# Patient Record
Sex: FEMALE | Race: WHITE | NOT HISPANIC OR LATINO | URBAN - METROPOLITAN AREA
[De-identification: names, ages, dates, MRNs, and addresses within clinical notes are randomized per-mention and may not be internally consistent; named-entity substitution may affect disease eponyms.]

---

## 2017-12-12 ENCOUNTER — EMERGENCY (EMERGENCY)
Facility: HOSPITAL | Age: 52
LOS: 1 days | Discharge: HOME | End: 2017-12-12
Attending: EMERGENCY MEDICINE | Admitting: EMERGENCY MEDICINE
Payer: COMMERCIAL

## 2017-12-12 VITALS
TEMPERATURE: 98 F | RESPIRATION RATE: 18 BRPM | HEIGHT: 62 IN | DIASTOLIC BLOOD PRESSURE: 83 MMHG | WEIGHT: 180.78 LBS | OXYGEN SATURATION: 97 % | HEART RATE: 67 BPM | SYSTOLIC BLOOD PRESSURE: 140 MMHG

## 2017-12-12 VITALS
SYSTOLIC BLOOD PRESSURE: 108 MMHG | OXYGEN SATURATION: 96 % | HEART RATE: 91 BPM | RESPIRATION RATE: 18 BRPM | TEMPERATURE: 97 F | DIASTOLIC BLOOD PRESSURE: 74 MMHG

## 2017-12-12 LAB
ALBUMIN SERPL ELPH-MCNC: 3.7 G/DL — SIGNIFICANT CHANGE UP (ref 3.3–5)
ALP SERPL-CCNC: 60 U/L — SIGNIFICANT CHANGE UP (ref 40–120)
ALT FLD-CCNC: 17 U/L — SIGNIFICANT CHANGE UP (ref 12–78)
ANION GAP SERPL CALC-SCNC: 5 MMOL/L — SIGNIFICANT CHANGE UP (ref 5–17)
APTT BLD: 30.9 SEC — SIGNIFICANT CHANGE UP (ref 27.5–37.4)
AST SERPL-CCNC: 16 U/L — SIGNIFICANT CHANGE UP (ref 15–37)
BASOPHILS # BLD AUTO: 0.1 K/UL — SIGNIFICANT CHANGE UP (ref 0–0.2)
BASOPHILS NFR BLD AUTO: 1.2 % — SIGNIFICANT CHANGE UP (ref 0–2)
BILIRUB SERPL-MCNC: 1.1 MG/DL — SIGNIFICANT CHANGE UP (ref 0.2–1.2)
BUN SERPL-MCNC: 15 MG/DL — SIGNIFICANT CHANGE UP (ref 7–23)
CALCIUM SERPL-MCNC: 8.9 MG/DL — SIGNIFICANT CHANGE UP (ref 8.5–10.1)
CHLORIDE SERPL-SCNC: 109 MMOL/L — HIGH (ref 96–108)
CK MB BLD-MCNC: 1.3 % — SIGNIFICANT CHANGE UP (ref 0–3.5)
CK MB BLD-MCNC: 1.6 % — SIGNIFICANT CHANGE UP (ref 0–3.5)
CK MB CFR SERPL CALC: 1.2 NG/ML — SIGNIFICANT CHANGE UP (ref 0–3.6)
CK MB CFR SERPL CALC: 1.5 NG/ML — SIGNIFICANT CHANGE UP (ref 0–3.6)
CK SERPL-CCNC: 90 U/L — SIGNIFICANT CHANGE UP (ref 26–192)
CK SERPL-CCNC: 92 U/L — SIGNIFICANT CHANGE UP (ref 26–192)
CO2 SERPL-SCNC: 29 MMOL/L — SIGNIFICANT CHANGE UP (ref 22–31)
CREAT SERPL-MCNC: 0.78 MG/DL — SIGNIFICANT CHANGE UP (ref 0.5–1.3)
EOSINOPHIL # BLD AUTO: 0.5 K/UL — SIGNIFICANT CHANGE UP (ref 0–0.5)
EOSINOPHIL NFR BLD AUTO: 6.6 % — HIGH (ref 0–6)
GLUCOSE SERPL-MCNC: 95 MG/DL — SIGNIFICANT CHANGE UP (ref 70–99)
HCT VFR BLD CALC: 42.1 % — SIGNIFICANT CHANGE UP (ref 34.5–45)
HGB BLD-MCNC: 14 G/DL — SIGNIFICANT CHANGE UP (ref 11.5–15.5)
INR BLD: 1.07 RATIO — SIGNIFICANT CHANGE UP (ref 0.88–1.16)
LYMPHOCYTES # BLD AUTO: 2.6 K/UL — SIGNIFICANT CHANGE UP (ref 1–3.3)
LYMPHOCYTES # BLD AUTO: 34.8 % — SIGNIFICANT CHANGE UP (ref 13–44)
MCHC RBC-ENTMCNC: 30.6 PG — SIGNIFICANT CHANGE UP (ref 27–34)
MCHC RBC-ENTMCNC: 33.4 GM/DL — SIGNIFICANT CHANGE UP (ref 32–36)
MCV RBC AUTO: 91.8 FL — SIGNIFICANT CHANGE UP (ref 80–100)
MONOCYTES # BLD AUTO: 0.5 K/UL — SIGNIFICANT CHANGE UP (ref 0–0.9)
MONOCYTES NFR BLD AUTO: 6.6 % — SIGNIFICANT CHANGE UP (ref 1–9)
NEUTROPHILS # BLD AUTO: 3.7 K/UL — SIGNIFICANT CHANGE UP (ref 1.8–7.4)
NEUTROPHILS NFR BLD AUTO: 50.8 % — SIGNIFICANT CHANGE UP (ref 43–77)
PLATELET # BLD AUTO: 139 K/UL — LOW (ref 150–400)
POTASSIUM SERPL-MCNC: 4.7 MMOL/L — SIGNIFICANT CHANGE UP (ref 3.5–5.3)
POTASSIUM SERPL-SCNC: 4.7 MMOL/L — SIGNIFICANT CHANGE UP (ref 3.5–5.3)
PROT SERPL-MCNC: 6.7 G/DL — SIGNIFICANT CHANGE UP (ref 6–8.3)
PROTHROM AB SERPL-ACNC: 11.7 SEC — SIGNIFICANT CHANGE UP (ref 9.8–12.7)
RBC # BLD: 4.58 M/UL — SIGNIFICANT CHANGE UP (ref 3.8–5.2)
RBC # FLD: 12.4 % — SIGNIFICANT CHANGE UP (ref 10.3–14.5)
SODIUM SERPL-SCNC: 143 MMOL/L — SIGNIFICANT CHANGE UP (ref 135–145)
TROPONIN I SERPL-MCNC: <.015 NG/ML — SIGNIFICANT CHANGE UP (ref 0.01–0.04)
TROPONIN I SERPL-MCNC: <.015 NG/ML — SIGNIFICANT CHANGE UP (ref 0.01–0.04)
WBC # BLD: 7.4 K/UL — SIGNIFICANT CHANGE UP (ref 3.8–10.5)
WBC # FLD AUTO: 7.4 K/UL — SIGNIFICANT CHANGE UP (ref 3.8–10.5)

## 2017-12-12 PROCEDURE — 99285 EMERGENCY DEPT VISIT HI MDM: CPT

## 2017-12-12 PROCEDURE — 71045 X-RAY EXAM CHEST 1 VIEW: CPT

## 2017-12-12 PROCEDURE — 80053 COMPREHEN METABOLIC PANEL: CPT

## 2017-12-12 PROCEDURE — 71010: CPT | Mod: 26

## 2017-12-12 PROCEDURE — 93010 ELECTROCARDIOGRAM REPORT: CPT

## 2017-12-12 PROCEDURE — 82553 CREATINE MB FRACTION: CPT

## 2017-12-12 PROCEDURE — 82550 ASSAY OF CK (CPK): CPT

## 2017-12-12 PROCEDURE — 36000 PLACE NEEDLE IN VEIN: CPT

## 2017-12-12 PROCEDURE — 99283 EMERGENCY DEPT VISIT LOW MDM: CPT | Mod: 25

## 2017-12-12 PROCEDURE — 85730 THROMBOPLASTIN TIME PARTIAL: CPT

## 2017-12-12 PROCEDURE — 85610 PROTHROMBIN TIME: CPT

## 2017-12-12 PROCEDURE — 85027 COMPLETE CBC AUTOMATED: CPT

## 2017-12-12 PROCEDURE — 93005 ELECTROCARDIOGRAM TRACING: CPT

## 2017-12-12 PROCEDURE — 36415 COLL VENOUS BLD VENIPUNCTURE: CPT

## 2017-12-12 PROCEDURE — 84484 ASSAY OF TROPONIN QUANT: CPT

## 2017-12-12 RX ORDER — ASPIRIN/CALCIUM CARB/MAGNESIUM 324 MG
325 TABLET ORAL ONCE
Qty: 0 | Refills: 0 | Status: COMPLETED | OUTPATIENT
Start: 2017-12-12 | End: 2017-12-12

## 2017-12-12 RX ORDER — SODIUM CHLORIDE 9 MG/ML
3 INJECTION INTRAMUSCULAR; INTRAVENOUS; SUBCUTANEOUS ONCE
Qty: 0 | Refills: 0 | Status: COMPLETED | OUTPATIENT
Start: 2017-12-12 | End: 2017-12-12

## 2017-12-12 RX ORDER — NATALIZUMAB 300 MG/15ML
0 INJECTION INTRAVENOUS
Qty: 0 | Refills: 0 | COMMUNITY

## 2017-12-12 RX ADMIN — SODIUM CHLORIDE 3 MILLILITER(S): 9 INJECTION INTRAMUSCULAR; INTRAVENOUS; SUBCUTANEOUS at 16:31

## 2017-12-12 RX ADMIN — Medication 325 MILLIGRAM(S): at 16:34

## 2017-12-12 NOTE — CONSULT NOTE ADULT - SUBJECTIVE AND OBJECTIVE BOX
[facility]         Vitaly Todd, Anoop, Jennifer, Mahnaz, Cooper, Mikel        [phone] (office)    CHIEF COMPLAINT: Patient is a 52y old  Female who presents with a chief complaint of cp    HPI: 52f smoker, MS x 13y without any limitations.  Active at baseline and goes to the gym 3-4 x per week, where she does aerobic and resistance training. No baseline sxs of angina, hf, or arrhythmia.  Today at ~1030 developed 5/10 chest discomfort felt to be "agita" with belching.  This has been constant since then.  Over time she began to develop sxs in her left jaw and arm.  No other rad or assoc sxs.  Came to er for evaluation. EKG and CE x 1 normal.      PAST MEDICAL & SURGICAL HISTORY:  Multiple sclerosis      SOCIAL HISTORY: No active   alcohol or illicit drug use. 1/2 ppd smoker    FAMILY HISTORY: htn, primary pulm htn      Outpatient medications: tycabri    MEDICATIONS  (STANDING):    MEDICATIONS  (PRN):      Allergies    penicillin (Unknown)    Intolerances        REVIEW OF SYSTEMS: Is negative for eye, ENT, GI, , allergic, dermatologic, musculoskeletal and neurologic, except as described above.    VITAL SIGNS:   Vital Signs Last 24 Hrs  T(C): 36.5 (12 Dec 2017 15:39), Max: 36.5 (12 Dec 2017 15:39)  T(F): 97.7 (12 Dec 2017 15:39), Max: 97.7 (12 Dec 2017 15:39)  HR: 67 (12 Dec 2017 15:39) (67 - 67)  BP: 140/83 (12 Dec 2017 15:39) (140/83 - 140/83)  BP(mean): --  RR: 18 (12 Dec 2017 15:39) (18 - 18)  SpO2: 97% (12 Dec 2017 15:39) (97% - 97%)    I&O's Summary      PHYSICAL EXAM:    Constitutional: NAD, awake and alert, well-developed  Eyes:  EOMI, no oral cyanosis, conjunctivae clear, anicteric.  Pulmonary: Non-labored, breath sounds are clear bilaterally, no wheezing, rales or rhonchi  Cardiovascular:  regular S1 and S2. 1-2/6 sys murmur.  No rubs, gallops or clicks  Gastrointestinal: Bowel Sounds present, soft, nontender.   Lymph: No peripheral edema.   Neurological: Alert, strength and sensitivity are grossly intact  Skin: No obvious lesions/rashes.   Psych:  Mood & affect appropriate .    LABS: All Labs Reviewed:                        14.0   7.4   )-----------( 139      ( 12 Dec 2017 16:21 )             42.1     12 Dec 2017 16:21    143    |  109    |  15     ----------------------------<  95     4.7     |  29     |  0.78     Ca    8.9        12 Dec 2017 16:21    TPro  6.7    /  Alb  3.7    /  TBili  1.1    /  DBili  x      /  AST  16     /  ALT  17     /  AlkPhos  60     12 Dec 2017 16:21    PT/INR - ( 12 Dec 2017 16:21 )   PT: 11.7 sec;   INR: 1.07 ratio         PTT - ( 12 Dec 2017 16:21 )  PTT:30.9 sec  CARDIAC MARKERS ( 12 Dec 2017 16:21 )  <.015 ng/mL / x     / 92 U/L / x     / 1.5 ng/mL      Blood Culture:         RADIOLOGY:    EKG: sr normal

## 2017-12-12 NOTE — CONSULT NOTE ADULT - ASSESSMENT
52f smoker, MS x 13y without any limitations.  Active at baseline and goes to the gym 3-4 x per week, where she does aerobic and resistance training. No baseline sxs of angina, hf, or arrhythmia.  Today at ~1030 developed 5/10 chest discomfort felt to be "agita" with belching.  This has been constant since then.  Over time she began to develop sxs in her left jaw and arm.  No other rad or assoc sxs.  Came to er for evaluation. EKG and CE x 1 normal.      The patient's chest discomfort is of uncertain etiology. Overall, at this point, it seems relatively unlikely that the patient is suffering chest discomfort on the basis of an acute coronary syndrome.    Based on the patient's history, physical examination, EKG and laboratory testing, it seems as if the patient is at not a high risk individual. Based on the patient's clinical course, I would recommend repetition of the patient's cardiac enzymes. If the patient's second set of enzymes is negative, I would recommend that they be discharged with the expectation that they would followup in our office for outpatient testing.  I have recommended outpatient echocardiography to make an assessment of cardiac structural integrity, as well as nuclear stress testing. The patient has been provided written instructions to obtain these in our office. The patient should continue aspirin, 81 mg daily, at least until the above-mentioned examinations have been completed.    If those examinations are unremarkable, alternative explanations for chest discomfort, including a gastrointestinal etiology and a musculoskeletal etiology, will need to be considered.    She has been advised to stop smoking.    Her cardiac murmur is suggestive of av sclerosis.

## 2017-12-12 NOTE — ED ADULT NURSE NOTE - CHPI ED SYMPTOMS NEG
no nausea/no diaphoresis/no dizziness/no vomiting/no shortness of breath/no fever/no back pain/no chills/no cough/no syncope

## 2017-12-12 NOTE — ED ADULT NURSE NOTE - OBJECTIVE STATEMENT
patient presenting to ED with chest pain radiating to jaw and left arm, denies any other discomfort at this time. Cardiac monitoring ongoing.

## 2017-12-12 NOTE — ED PROVIDER NOTE - PROGRESS NOTE DETAILS
dean (cards) seen eval pt, requests 2nd ce at 2000 and d/c if neg for outpt f/u Reevaluated patient at bedside.  Patient feeling well.  Discussed the results of all diagnostic testing in ED and copies of all reports given.   An opportunity to ask questions was given.  Discussed the importance of prompt, close medical follow-up.  Patient will return with any changes, concerns or persistent / worsening symptoms.  Understanding of all instructions verbalized.

## 2017-12-12 NOTE — ED PROVIDER NOTE - OBJECTIVE STATEMENT
pt with constant left chest pressure radiating to jaw and left arm today. no fevers, chills, ha, sob, cough, abd pain, edema, calf pain, travel.  pmd - buffa  cards - none

## 2020-04-27 NOTE — ED ADULT TRIAGE NOTE - DIRECT TO ROOM CARE INITIATED:
lipitor  Last visit: 1/10/20  Follow up visit: none  Last refill: 1/13/20  Labs:   CHOLESTEROL (mg/dL)   Date Value   01/10/2020 163     Cholesterol (mg/dL)   Date Value   03/13/2020 110     HDL (mg/dL)   Date Value   03/13/2020 32 (L)   01/10/2020 34 (L)     CHOL/HDL (no units)   Date Value   01/10/2020 4.8 (H)     Cholesterol/ HDL Ratio (no units)   Date Value   03/13/2020 3.4     TRIGLYCERIDE (mg/dL)   Date Value   01/10/2020 148     Triglycerides (mg/dL)   Date Value   03/13/2020 136     CALCULATED LDL (mg/dL)   Date Value   01/10/2020 99     LDL (mg/dL)   Date Value   03/13/2020 51     Sodium (mmol/L)   Date Value   01/10/2020 143     Potassium (mmol/L)   Date Value   01/10/2020 4.3     Chloride (mmol/L)   Date Value   01/10/2020 106     Glucose (mg/dL)   Date Value   01/10/2020 102 (H)     CALCIUM (mg/dL)   Date Value   01/10/2020 8.8     Carbon Dioxide (mmol/L)   Date Value   01/10/2020 29     BUN (mg/dL)   Date Value   01/10/2020 23 (H)     Creatinine (mg/dL)   Date Value   01/10/2020 1.03     TSH (mcUnits/mL)   Date Value   10/12/2017 3.006         Informed patient that filled for 90 days and 3 refills on 1/13/2020     12-Dec-2017 15:39

## 2021-09-16 PROBLEM — G35 MULTIPLE SCLEROSIS: Chronic | Status: ACTIVE | Noted: 2017-12-12

## 2021-10-28 ENCOUNTER — APPOINTMENT (OUTPATIENT)
Dept: RHEUMATOLOGY | Facility: CLINIC | Age: 56
End: 2021-10-28
Payer: COMMERCIAL

## 2021-10-28 VITALS
DIASTOLIC BLOOD PRESSURE: 80 MMHG | TEMPERATURE: 97.6 F | SYSTOLIC BLOOD PRESSURE: 133 MMHG | OXYGEN SATURATION: 98 % | HEART RATE: 65 BPM

## 2021-10-28 DIAGNOSIS — M79.642 PAIN IN RIGHT HAND: ICD-10-CM

## 2021-10-28 DIAGNOSIS — F17.200 NICOTINE DEPENDENCE, UNSPECIFIED, UNCOMPLICATED: ICD-10-CM

## 2021-10-28 DIAGNOSIS — Z11.59 ENCOUNTER FOR SCREENING FOR OTHER VIRAL DISEASES: ICD-10-CM

## 2021-10-28 DIAGNOSIS — M79.641 PAIN IN RIGHT HAND: ICD-10-CM

## 2021-10-28 PROCEDURE — 99204 OFFICE O/P NEW MOD 45 MIN: CPT

## 2021-10-28 RX ORDER — NATALIZUMAB 300 MG/15ML
300 INJECTION INTRAVENOUS
Refills: 0 | Status: ACTIVE | COMMUNITY
Start: 2021-10-28

## 2021-11-02 LAB
CCP AB SER IA-ACNC: <8 UNITS
COVID-19 SPIKE DOMAIN ANTIBODY INTERPRETATION: POSITIVE
RF+CCP IGG SER-IMP: NEGATIVE
RHEUMATOID FACT SER QL: <10 IU/ML
SARS-COV-2 AB SERPL IA-ACNC: >250 U/ML

## 2022-04-11 PROBLEM — Z11.59 SCREENING FOR VIRAL DISEASE: Status: ACTIVE | Noted: 2021-10-28

## 2022-09-21 ENCOUNTER — NON-APPOINTMENT (OUTPATIENT)
Age: 57
End: 2022-09-21

## 2022-09-21 ENCOUNTER — APPOINTMENT (OUTPATIENT)
Dept: ORTHOPEDIC SURGERY | Facility: CLINIC | Age: 57
End: 2022-09-21

## 2022-09-21 PROCEDURE — 20550 NJX 1 TENDON SHEATH/LIGAMENT: CPT | Mod: F2

## 2022-09-21 PROCEDURE — 99203 OFFICE O/P NEW LOW 30 MIN: CPT | Mod: 25

## 2022-09-21 NOTE — CONSULT LETTER
[Dear  ___] : Dear  [unfilled], [Consult Letter:] : I had the pleasure of evaluating your patient, [unfilled]. [Please see my note below.] : Please see my note below. [Consult Closing:] : Thank you very much for allowing me to participate in the care of this patient.  If you have any questions, please do not hesitate to contact me. [Sincerely,] : Sincerely, [FreeTextEntry3] : Maurizio Jeffries M.D.\par Surgery of the Hand and Upper Extremity\par Orthopaedic Surgery\par Chief, Hand Service, [facility]\par Director, Hand Service, [facility]\par  Of Orthopaedic Surgery, [employer] at [facility]\par [facility] Email: [email or web address] Office Phone: [phone]

## 2022-09-21 NOTE — END OF VISIT
[FreeTextEntry3] : This note was written by Roya Guardado on 09/21/2022 acting solely as a scribe for Dr. Maurizio Jeffries.\par  \par All medical record entries made by the Scribe were at my, Dr. Maurizio Jeffries, direction and personally dictated by me on 09/21/2022. I have personally reviewed the chart and agree that the record accurately reflects my personal performance of the history, physical exam, assessment and plan.

## 2022-09-21 NOTE — DISCUSSION/SUMMARY
[FreeTextEntry1] : She has findings consistent with a recurrent left middle finger trigger finger, status post 1 prior cortisone injection 6 months ago.  She has a small associated Dupuytren's nodule proximal to the left middle finger A1 pulley.  She also has a very mild early right middle finger trigger finger.\par \par I had a discussion with the patient regarding today's visit, the prognosis of this diagnosis, and treatment recommendations and options. With regard to the left middle finger, she agreed to proceed with a repeat cortisone injection at the flexor tendon sheath. She understands that this may not provide her with long term relief and if it does not, I would recommend she return to the office to discuss further treatment recommendations.\par \par With regard to the right middle finger, as her symptoms are quite mild at this time, I have recommended observation. I did tell her that there is some systemic absorption of the cortisone and she may get relief given that I did injection the left side today. She will follow up as needed, according to her symptoms in this regard. \par \par She has agreed to the above plan of management and has expressed full understanding.  All questions were fully answered to their satisfaction. \par \par My cumulative time spent on this visit included: Preparation for the visit, review of the medical records, review of pertinent diagnostic studies, examination and counseling of the patient on the above diagnosis, treatment plan and prognosis, orders of diagnostic tests, medication and/or appropriate procedures and documentation in the medical records of today's visit.

## 2022-09-21 NOTE — PHYSICAL EXAM
[de-identified] : - Constitutional: This is a female in no obvious distress.  She is accompanied by her  today.\par - Psych: Patient is alert and oriented to person, place and time.  Patient has a normal mood and affect.\par - Cardiovascular: Normal pulses throughout the upper extremities.  No significant varicosities are noted in the upper extremities. \par - Neuro: Strength and sensation are intact throughout the upper extremities.  Patient has normal coordination.\par - Respiratory:  Patient exhibits no evidence of shortness of breath or difficulty breathing.\par - Skin: No rashes, lesions, or other abnormalities are noted in the upper extremities.\par \par --- \par \par Examination of her left hand demonstrates mild swelling and tenderness along the A1 pulley.  There is no triggering, as she cannot flex to the point of triggering.  She has a small Dupuytren's nodule proximal to the A1 pulley of the middle finger.  There is no triggering of the other digits.  She is neurovascularly intact distally.\par \par Examination of her right hand demonstrates mild swelling and tenderness along the A1 pulley of the middle finger.  There is very minimal triggering.  There is no triggering of the other digits.  She is neurovascularly intact distally.

## 2022-09-21 NOTE — ADDENDUM
[FreeTextEntry1] : I, Roya Guardado, acted solely as a scribe for Dr. Jeffries on this date on 09/21/2022.

## 2022-09-21 NOTE — PROCEDURE
[FreeTextEntry1] : -  After a discussion of risks and benefits, the patient agreed to proceed with a cortisone injection.  \par -  Side: Left\par -  Finger: Middle finger trigger finger\par -  Medications: 0.5 cc of 1% Lidocaine and 1 cc of betamethasone, using sterile technique.\par -  Patient tolerated the procedure well, without complications.\par -  Patient was told that the symptoms may worsen for a day or two, and should then begin to improve. \par -  Instructions: Patient was instructed on activity modification for the next several days.\par -  Follow-up: Within 4 weeks to assess response to the injection.

## 2022-09-21 NOTE — HISTORY OF PRESENT ILLNESS
[Right] : right hand dominant [FreeTextEntry1] : She comes in today for evaluation of bilateral hand pain, localized to the middle fingers. She has a history of a left middle trigger finger which initially developed about 6 months ago. She was treated about 3 to 4 months ago by a rheumatologist, Dr. Dora Bonds. Dr. Bonds gave her a cortisone injection at the left middle finger which provided her with relief up until recently. As of about 10 days ago, the symptoms have recurred. She also complains of a more mild right middle trigger finger which only recently developed. She wears splints at night. She denies numbness and tingling. She takes Ibuprofen as needed. She rates her pain as a 4 out of 10 at this time. \par \par She has a medical history which includes MS. She is treated with monthly infusions.\par \par She is accompanied by her  today.

## 2022-10-06 RX ORDER — BETAMETHA AC,SOD PHOS/WATER/PF 6 MG/ML
6 (3-3) VIAL (ML) INJECTION
Qty: 1 | Refills: 0 | Status: COMPLETED | OUTPATIENT
Start: 2022-10-06

## 2022-10-06 RX ORDER — LIDOCAINE HYDROCHLORIDE 10 MG/ML
1 INJECTION, SOLUTION INFILTRATION; PERINEURAL
Refills: 0 | Status: COMPLETED | OUTPATIENT
Start: 2022-10-06

## 2022-10-06 RX ADMIN — Medication %: at 00:00

## 2022-10-06 RX ADMIN — Medication MG/ML: at 00:00

## 2022-12-10 ENCOUNTER — RESULT CHARGE (OUTPATIENT)
Age: 57
End: 2022-12-10

## 2022-12-12 ENCOUNTER — NON-APPOINTMENT (OUTPATIENT)
Age: 57
End: 2022-12-12

## 2022-12-12 ENCOUNTER — APPOINTMENT (OUTPATIENT)
Dept: INTERNAL MEDICINE | Facility: CLINIC | Age: 57
End: 2022-12-12

## 2022-12-12 VITALS
WEIGHT: 213 LBS | RESPIRATION RATE: 16 BRPM | DIASTOLIC BLOOD PRESSURE: 80 MMHG | SYSTOLIC BLOOD PRESSURE: 128 MMHG | OXYGEN SATURATION: 98 % | TEMPERATURE: 97.5 F | BODY MASS INDEX: 39.2 KG/M2 | HEIGHT: 62 IN | HEART RATE: 70 BPM

## 2022-12-12 DIAGNOSIS — G35 MULTIPLE SCLEROSIS: ICD-10-CM

## 2022-12-12 DIAGNOSIS — Z00.00 ENCOUNTER FOR GENERAL ADULT MEDICAL EXAMINATION W/OUT ABNORMAL FINDINGS: ICD-10-CM

## 2022-12-12 DIAGNOSIS — R33.9 RETENTION OF URINE, UNSPECIFIED: ICD-10-CM

## 2022-12-12 DIAGNOSIS — Z13.1 ENCOUNTER FOR SCREENING FOR DIABETES MELLITUS: ICD-10-CM

## 2022-12-12 DIAGNOSIS — Z82.49 FAMILY HISTORY OF ISCHEMIC HEART DISEASE AND OTHER DISEASES OF THE CIRCULATORY SYSTEM: ICD-10-CM

## 2022-12-12 DIAGNOSIS — Z87.891 PERSONAL HISTORY OF NICOTINE DEPENDENCE: ICD-10-CM

## 2022-12-12 PROCEDURE — 99386 PREV VISIT NEW AGE 40-64: CPT | Mod: 25

## 2022-12-12 PROCEDURE — G0444 DEPRESSION SCREEN ANNUAL: CPT | Mod: 59

## 2022-12-12 PROCEDURE — 99214 OFFICE O/P EST MOD 30 MIN: CPT | Mod: 25

## 2022-12-12 PROCEDURE — 93000 ELECTROCARDIOGRAM COMPLETE: CPT | Mod: 59

## 2022-12-12 RX ORDER — ACETAMINOPHEN 160 MG/5ML
SUSPENSION, ORAL (FINAL DOSE FORM) ORAL
Refills: 0 | Status: ACTIVE | COMMUNITY

## 2022-12-12 RX ORDER — TAMSULOSIN HYDROCHLORIDE 0.4 MG/1
0.4 CAPSULE ORAL
Qty: 90 | Refills: 0 | Status: ACTIVE | COMMUNITY
Start: 2022-11-29

## 2022-12-12 RX ORDER — COLD-HOT PACK
125 MCG EACH MISCELLANEOUS
Refills: 0 | Status: ACTIVE | COMMUNITY

## 2022-12-12 NOTE — HISTORY OF PRESENT ILLNESS
[FreeTextEntry1] : new patient annual wellness visit [de-identified] : New patient is a 57 year old female with a past medical history as below who presents for an annual wellness visit. \par \par Patient has seen GYN in the past year for her annual visit. Her last breast imaging (Mammogram/Breast US) was in August 2022 at [facility]. \par Patient had a screening colonoscopy approximately 4 years ago with gastroenterologist, Dr. Bustos.\par \par Patient sees neurologist, Dr. Luna given history of MS; currently on Tysabri (monthly infusion). She states she will be starting vestibular/cognitive therapy.  \par \par Patient sees urologist, Dr. Bella given history of frequent UTIs diagnosed as secondary to urinary retention; currently on Tamsulosin. \par \par Patient had noted significant weight loss in the past on [employer] (150 lbs). \par Patient has noted GARCIA which she states is secondary to obesity/excess weight. She denies chest pain or palpitations. \par \par Patient received the flu vaccine for this season at [facility]. She states she received the Tetanus Vaccine approximately 8 years ago. She has not received the Shingles (Shingrix) Vaccine. Patient is vaccinated against COVID-19 (3 doses). She notes history of COVID-19 infection.\par \par Patient notes history of smoking; 1ppd for 10 years. She recently quit smoking.

## 2022-12-12 NOTE — HEALTH RISK ASSESSMENT
[Current] : Current [Yes] : Yes [2 - 3 times a week (3 pts)] : 2 - 3  times a week (3 points) [1 or 2 (0 pts)] : 1 or 2 (0 points) [Never (0 pts)] : Never (0 points) [No] : In the past 12 months have you used drugs other than those required for medical reasons? No [0] : 2) Feeling down, depressed, or hopeless: Not at all (0) [PHQ-2 Negative - No further assessment needed] : PHQ-2 Negative - No further assessment needed [de-identified] : Once or twice in past year; 1ppd for 10 years. [Audit-CScore] : 3 [[id number]] : 0 [MammogramComments] : Results to be requested from [facility]. [PapSmearComments] : Results to be requested from GYN's office (Vanessa Tao NP). [ColonoscopyComments] : Results to be requested from Dr. Bustos's office.

## 2022-12-12 NOTE — PLAN
[FreeTextEntry1] : Pulmonary\par GARCIA - check EKG (results as above) - likely secondary to obesity/excess weight - advised low carbohydrate/low sugar diet and recommended gradually increasing CV exercise to promote weight loss\par Endocrinology\par obesity - advised low carbohydrate/low sugar diet; recommended gradually increasing CV exercise - check hemoglobin A1C and TSH\par Continue Vitamin D-3 5000 IU p.o.q.d. with a meal as directed - check Vitamin D \par Gynecology\par Results of most recent pap smear to be requested from GYN's office (Vanessa Tao NP); results of most recent breast imaging (Mammogram/Breast US) to be requested from [facility]\par Gastroenterology\par Results of screening colonoscopy to be requested from gastroenterologist, Dr. Bustos's office\par Neurology\par MS - continue IV Tysabri monthly - continue to follow up with neurologist, Dr. Luna\par Urology\par history of urinary retention - continue Tamsulosin HCl 0.4mg p.o.q.h.s. as directed - continue to follow up with urologist, Dr. Bella\par Immunization\par Shingrix - discussed, patient to determine if vaccine is covered under medical benefits of current insurance plan and follow up for 1st dose if interested; otherwise, instructed to follow up at the pharmacy for vaccine administration\par S/p COVID-19 Vaccine (x3) - recommended following up at pharmacy for updated COVID-19 vaccine booster, declined\par \par check EKG (results as above), female panel, and hemoglobin A1C

## 2022-12-12 NOTE — ADDENDUM
[FreeTextEntry1] : I, Antonio Washington, acted solely as scribe for Dr. Jhony Saba, DO this date 12/12/2022  2:30PM .\par \par All medical record entries made by the Scribe were at my, Dr. Jhony Saba, DO direction and personally dictated by me on 12/12/2022  2:30PM. I have reviewed the chart and agree that the record accurately reflects my personal performance of the history, physical exam, assessment and plan. I have also personally directed, reviewed and agreed with the chart.

## 2022-12-12 NOTE — PHYSICAL EXAM
[No Acute Distress] : no acute distress [Well Nourished] : well nourished [Well Developed] : well developed [Well-Appearing] : well-appearing [Normal Sclera/Conjunctiva] : normal sclera/conjunctiva [PERRL] : pupils equal round and reactive to light [EOMI] : extraocular movements intact [Normal Outer Ear/Nose] : the outer ears and nose were normal in appearance [Normal Oropharynx] : the oropharynx was normal [No JVD] : no jugular venous distention [No Lymphadenopathy] : no lymphadenopathy [Supple] : supple [Thyroid Normal, No Nodules] : the thyroid was normal and there were no nodules present [No Respiratory Distress] : no respiratory distress  [No Accessory Muscle Use] : no accessory muscle use [Clear to Auscultation] : lungs were clear to auscultation bilaterally [Normal Rate] : normal rate  [Regular Rhythm] : with a regular rhythm [Normal S1, S2] : normal S1 and S2 [No Murmur] : no murmur heard [No Carotid Bruits] : no carotid bruits [No Abdominal Bruit] : a ~M bruit was not heard ~T in the abdomen [No Varicosities] : no varicosities [Pedal Pulses Present] : the pedal pulses are present [No Edema] : there was no peripheral edema [No Palpable Aorta] : no palpable aorta [No Extremity Clubbing/Cyanosis] : no extremity clubbing/cyanosis [Soft] : abdomen soft [Non Tender] : non-tender [Non-distended] : non-distended [No Masses] : no abdominal mass palpated [No HSM] : no HSM [Normal Bowel Sounds] : normal bowel sounds [Normal Posterior Cervical Nodes] : no posterior cervical lymphadenopathy [Normal Anterior Cervical Nodes] : no anterior cervical lymphadenopathy [No CVA Tenderness] : no CVA  tenderness [No Spinal Tenderness] : no spinal tenderness [No Joint Swelling] : no joint swelling [Grossly Normal Strength/Tone] : grossly normal strength/tone [No Rash] : no rash [Coordination Grossly Intact] : coordination grossly intact [No Focal Deficits] : no focal deficits [Normal Gait] : normal gait [Deep Tendon Reflexes (DTR)] : deep tendon reflexes were 2+ and symmetric [Normal Affect] : the affect was normal [Normal Insight/Judgement] : insight and judgment were intact [Normal Voice/Communication] : normal voice/communication [Normal TMs] : both tympanic membranes were normal [Normal Nasal Mucosa] : the nasal mucosa was normal [Normal Supraclavicular Nodes] : no supraclavicular lymphadenopathy [Kyphosis] : no kyphosis [Scoliosis] : no scoliosis [Acne] : no acne [Speech Grossly Normal] : speech grossly normal [Memory Grossly Normal] : memory grossly normal [Alert and Oriented x3] : oriented to person, place, and time [Normal Mood] : the mood was normal [de-identified] : done by GYN [de-identified] : obese [FreeTextEntry1] : done by ADRIEN Damico)

## 2022-12-12 NOTE — ASSESSMENT
[FreeTextEntry1] : New patient is a 57 year old female with a past medical history as above who presents for an annual wellness visit.\par

## 2022-12-31 ENCOUNTER — NON-APPOINTMENT (OUTPATIENT)
Age: 57
End: 2022-12-31

## 2023-01-19 PROBLEM — M72.0 DUPUYTREN'S DISEASE OF PALM OF LEFT HAND: Status: ACTIVE | Noted: 2022-09-21

## 2023-01-19 PROBLEM — M65.331 TRIGGER MIDDLE FINGER OF RIGHT HAND: Status: ACTIVE | Noted: 2022-09-21

## 2023-01-19 PROBLEM — M65.332 TRIGGER MIDDLE FINGER OF LEFT HAND: Status: ACTIVE | Noted: 2022-09-21

## 2023-01-25 ENCOUNTER — APPOINTMENT (OUTPATIENT)
Dept: ORTHOPEDIC SURGERY | Facility: CLINIC | Age: 58
End: 2023-01-25
Payer: COMMERCIAL

## 2023-01-25 DIAGNOSIS — M65.331 TRIGGER FINGER, RIGHT MIDDLE FINGER: ICD-10-CM

## 2023-01-25 DIAGNOSIS — M65.342 TRIGGER FINGER, LEFT RING FINGER: ICD-10-CM

## 2023-01-25 DIAGNOSIS — M65.332 TRIGGER FINGER, LEFT MIDDLE FINGER: ICD-10-CM

## 2023-01-25 DIAGNOSIS — M72.0 PALMAR FASCIAL FIBROMATOSIS [DUPUYTREN]: ICD-10-CM

## 2023-01-25 PROCEDURE — 20550 NJX 1 TENDON SHEATH/LIGAMENT: CPT | Mod: LT,F2

## 2023-01-25 PROCEDURE — 99214 OFFICE O/P EST MOD 30 MIN: CPT | Mod: 25

## 2023-01-25 RX ORDER — LIDOCAINE HYDROCHLORIDE 10 MG/ML
1 INJECTION, SOLUTION INFILTRATION; PERINEURAL
Refills: 0 | Status: COMPLETED | OUTPATIENT
Start: 2023-01-25

## 2023-01-25 RX ORDER — BETAMETHA AC,SOD PHOS/WATER/PF 6 MG/ML
6 (3-3) VIAL (ML) INJECTION
Qty: 1 | Refills: 0 | Status: COMPLETED | OUTPATIENT
Start: 2023-01-25

## 2023-01-25 RX ADMIN — LIDOCAINE HYDROCHLORIDE 0.5 %: 10 INJECTION, SOLUTION EPIDURAL; INFILTRATION; INTRACAUDAL; PERINEURAL at 00:00

## 2023-01-25 RX ADMIN — BETAMETHASONE SODIUM PHOSPHATE AND BETAMETHASONE ACETATE 1 MG/ML: 3; 3 INJECTION, SUSPENSION INTRA-ARTICULAR; INTRALESIONAL; INTRAMUSCULAR; SOFT TISSUE at 00:00

## 2023-01-25 NOTE — DISCUSSION/SUMMARY
[FreeTextEntry1] : I had a discussion regarding today's visit, the diagnosis and treatment recommendations and options.  We also discussed changes since the last visit. With regard to the left middle finger, given the recurrence of her symptoms, we discussed further treatment options of a repeat cortisone injection which would be her third versus surgical release. She deferred surgical release and has opted for a repeat cortisone injection at the flexor tendon sheath of the left middle finger. She understands that this may not provide her with long term relief and if it does not, I would recommend she return to the office for further treatment such as surgery. She also understands that as she opted for a cortisone injection today, she will be unable to undergo surgery for 3 months given the increased risk of postoperative infection as a result of the cortisone.\par \par With regard the left ring finger, given her symptoms are mild, I recommended observation in this regard. She will follow up as needed, according to her symptoms. \par \par The patient has agreed to the above plan of management and has expressed full understanding.  All questions were fully answered to the patient's satisfaction.\par \par My cumulative time spent on today's visit was greater than 30 minutes and included: Preparation for the visit, review of the medical records, review of pertinent diagnostic studies, examination and counseling of the patient on the above diagnosis, treatment plan and prognosis, orders of diagnostic tests, medications and/or appropriate procedures and documentation in the medical records of today's visit.

## 2023-01-25 NOTE — PHYSICAL EXAM
[de-identified] : - Constitutional: This is a female in no obvious distress.  She is accompanied by her  today.\par - Psych: Patient is alert and oriented to person, place and time.  Patient has a normal mood and affect.\par - Cardiovascular: Normal pulses throughout the upper extremities.  No significant varicosities are noted in the upper extremities. \par - Neuro: Strength and sensation are intact throughout the upper extremities.  Patient has normal coordination.\par - Respiratory:  Patient exhibits no evidence of shortness of breath or difficulty breathing.\par - Skin: No rashes, lesions, or other abnormalities are noted in the upper extremities.\par \par --- \par \par Examination of her left hand demonstrates swelling and tenderness along the A1 pulley.  There is triggering of the middle finger.  She has a small Dupuytren's nodule proximal to the A1 pulley of the middle finger.  There is some tenderness swelling along the A1 pulley of the ring finger without obvious triggering of the ring finger.  There is no triggering of the other digits.  She is neurovascularly intact distally.

## 2023-01-25 NOTE — ADDENDUM
[FreeTextEntry1] : I, Roya Guardado, acted solely as a scribe for Dr. Jeffries on this date on 01/25/2023.

## 2023-01-25 NOTE — HISTORY OF PRESENT ILLNESS
[Right] : right hand dominant [FreeTextEntry1] : Follow-up regarding left middle finger trigger finger, a small associated Dupuytren's nodule proximal to the left middle finger A1 pulley and a very mild early right middle finger trigger finger.\par \par See note from when she was seen in the office greater than 4 months ago.  She was given a second cortisone injection at her left middle finger trigger finger.  Her first injection was given by her rheumatologist.\par \par She returns today with recurrence of her symptoms at the left middle finger. She additionally notes pain and triggering at the left ring finger, which she has not had prior treatment for. \par \par She has a medical history which includes MS. She is treated with monthly infusions.\par \par She is accompanied by her  today.

## 2023-01-25 NOTE — PROCEDURE
[FreeTextEntry1] : -  After a discussion of risks and benefits, the patient agreed to proceed with a cortisone injection.  \par -  Side: Right \par -  Finger: Ring finger trigger finger\par -  Medications: 0.5 cc of 1% Lidocaine and 1 cc of Celestone Soluspan, 6mg/cc, using sterile technique.\par -  Patient tolerated the procedure well, without complications.\par -  Patient was told that the symptoms may worsen for a day or two, and should then begin to improve. \par -  Instructions: Patient was instructed on activity modification for the next several days.\par -  Follow-up: According to her symptoms

## 2023-01-25 NOTE — END OF VISIT
[FreeTextEntry3] : This note was written by Roya Guardado on 01/25/2023 acting solely as a scribe for Dr. Maurizio Jeffries.\par  \par All medical record entries made by the Scribe were at my, Dr. Maurizio Jeffries, direction and personally dictated by me on 01/25/2023. I have personally reviewed the chart and agree that the record accurately reflects my personal performance of the history, physical exam, assessment and plan.

## 2023-07-20 ENCOUNTER — NON-APPOINTMENT (OUTPATIENT)
Age: 58
End: 2023-07-20

## 2023-09-05 ENCOUNTER — APPOINTMENT (OUTPATIENT)
Dept: INTERNAL MEDICINE | Facility: CLINIC | Age: 58
End: 2023-09-05
Payer: COMMERCIAL

## 2023-09-05 VITALS
HEIGHT: 62 IN | TEMPERATURE: 97.9 F | OXYGEN SATURATION: 98 % | DIASTOLIC BLOOD PRESSURE: 78 MMHG | SYSTOLIC BLOOD PRESSURE: 118 MMHG | HEART RATE: 79 BPM | RESPIRATION RATE: 17 BRPM

## 2023-09-05 DIAGNOSIS — R53.83 OTHER FATIGUE: ICD-10-CM

## 2023-09-05 DIAGNOSIS — R10.9 UNSPECIFIED ABDOMINAL PAIN: ICD-10-CM

## 2023-09-05 DIAGNOSIS — E66.9 OBESITY, UNSPECIFIED: ICD-10-CM

## 2023-09-05 DIAGNOSIS — E86.0 DEHYDRATION: ICD-10-CM

## 2023-09-05 DIAGNOSIS — R19.7 DIARRHEA, UNSPECIFIED: ICD-10-CM

## 2023-09-05 PROCEDURE — 99214 OFFICE O/P EST MOD 30 MIN: CPT | Mod: 25

## 2023-09-05 NOTE — REVIEW OF SYSTEMS
[Abdominal Pain] : abdominal pain [Diarrhea] : diarrhea [Fever] : no fever [Chills] : no chills [Fatigue] : fatigue [Hot Flashes] : no hot flashes [Night Sweats] : no night sweats [Recent Change In Weight] : ~T no recent weight change [Nausea] : no nausea [Constipation] : no constipation [Vomiting] : no vomiting [Heartburn] : no heartburn [Negative] : Heme/Lymph

## 2023-09-05 NOTE — PHYSICAL EXAM
[No Acute Distress] : no acute distress [Well Nourished] : well nourished [Well Developed] : well developed [Well-Appearing] : well-appearing [Normal Sclera/Conjunctiva] : normal sclera/conjunctiva [PERRL] : pupils equal round and reactive to light [EOMI] : extraocular movements intact [Normal Outer Ear/Nose] : the outer ears and nose were normal in appearance [Normal Oropharynx] : the oropharynx was normal [No JVD] : no jugular venous distention [No Lymphadenopathy] : no lymphadenopathy [Supple] : supple [Thyroid Normal, No Nodules] : the thyroid was normal and there were no nodules present [No Respiratory Distress] : no respiratory distress  [No Accessory Muscle Use] : no accessory muscle use [Clear to Auscultation] : lungs were clear to auscultation bilaterally [Normal Rate] : normal rate  [Regular Rhythm] : with a regular rhythm [Normal S1, S2] : normal S1 and S2 [No Murmur] : no murmur heard [No Carotid Bruits] : no carotid bruits [No Abdominal Bruit] : a ~M bruit was not heard ~T in the abdomen [No Varicosities] : no varicosities [Pedal Pulses Present] : the pedal pulses are present [No Edema] : there was no peripheral edema [No Palpable Aorta] : no palpable aorta [No Extremity Clubbing/Cyanosis] : no extremity clubbing/cyanosis [Soft] : abdomen soft [Non Tender] : non-tender [Non-distended] : non-distended [No Masses] : no abdominal mass palpated [No HSM] : no HSM [Normal Bowel Sounds] : normal bowel sounds [Declined Rectal Exam] : declined rectal exam [Normal Posterior Cervical Nodes] : no posterior cervical lymphadenopathy [Normal Anterior Cervical Nodes] : no anterior cervical lymphadenopathy [No CVA Tenderness] : no CVA  tenderness [No Spinal Tenderness] : no spinal tenderness [No Joint Swelling] : no joint swelling [Grossly Normal Strength/Tone] : grossly normal strength/tone [No Rash] : no rash [Coordination Grossly Intact] : coordination grossly intact [No Focal Deficits] : no focal deficits [Normal Gait] : normal gait [Deep Tendon Reflexes (DTR)] : deep tendon reflexes were 2+ and symmetric [Normal Affect] : the affect was normal [Normal Insight/Judgement] : insight and judgment were intact [Normal Voice/Communication] : normal voice/communication [Normal Nasal Mucosa] : the nasal mucosa was normal [No Hernias] : no hernias [Normal Supraclavicular Nodes] : no supraclavicular lymphadenopathy [Kyphosis] : no kyphosis [Scoliosis] : no scoliosis [Acne] : no acne [Speech Grossly Normal] : speech grossly normal [Memory Grossly Normal] : memory grossly normal [Alert and Oriented x3] : oriented to person, place, and time [Normal Mood] : the mood was normal [de-identified] : Dry muscos memebranes  [de-identified] : obese , mild diffuse tenderness to palpation no rebound/guarding

## 2023-09-05 NOTE — ASSESSMENT
[FreeTextEntry1] : Patient is a 57-yearold female with a past medical history as above presenting with diarrhea and abdominal cramping of 1 week's duration with episodes of dark/black stool. Differential diagnosis includes infectious causes, inflammatory causes and GI bleeding.

## 2023-09-05 NOTE — HISTORY OF PRESENT ILLNESS
[Spouse] : spouse [FreeTextEntry8] : Patient is a 57-year-old female with a past medical history as below presenting for evaluation of diarrhea for 1 week's duration. The patient describes diarrhea as occurring 3-4 times per day. It varies in color though there have been episodes of black diarrhea over past 4 days. The patient denies fever, chills, recent travel, eating raw or undercooked food, new pets or recent antibiotic use. She does note increased belching/flatulence and mild diffuse abdominal cramping. She is tolerating a bland diet and trying to keep herself hydrated with water. Her last colonoscopy was approx. 5 years ago performed by Dr. Alli Bustos which was reportedly WNL. Patient noted taking Pepto-Bismol approx. 4 days ago prior to noticing black diarrhea. Additionally, patient admits to taking 1 tablet of Advil every evening for 2 years secondary to awaking with headaches from sinus etiology.

## 2023-09-06 ENCOUNTER — NON-APPOINTMENT (OUTPATIENT)
Age: 58
End: 2023-09-06

## 2023-09-06 LAB
ALBUMIN SERPL ELPH-MCNC: 4.7 G/DL
ALP BLD-CCNC: 70 U/L
ALT SERPL-CCNC: 11 U/L
ANION GAP SERPL CALC-SCNC: 15 MMOL/L
AST SERPL-CCNC: 16 U/L
BILIRUB SERPL-MCNC: 0.9 MG/DL
BUN SERPL-MCNC: 8 MG/DL
CALCIUM SERPL-MCNC: 9.4 MG/DL
CDIFF BY PCR: NOT DETECTED
CHLORIDE SERPL-SCNC: 105 MMOL/L
CO2 SERPL-SCNC: 24 MMOL/L
CREAT SERPL-MCNC: 0.75 MG/DL
EGFR: 92 ML/MIN/1.73M2
ENTEROPATHOGENIC E. COLI (EPEC): DETECTED
GI PCR PANEL: DETECTED
GLUCOSE SERPL-MCNC: 91 MG/DL
HCT VFR BLD CALC: 46.9 %
HGB BLD-MCNC: 15.1 G/DL
MCHC RBC-ENTMCNC: 30.9 PG
MCHC RBC-ENTMCNC: 32.2 GM/DL
MCV RBC AUTO: 95.9 FL
PLATELET # BLD AUTO: 150 K/UL
POTASSIUM SERPL-SCNC: 4.6 MMOL/L
PROT SERPL-MCNC: 6.5 G/DL
RBC # BLD: 4.89 M/UL
RBC # FLD: 14.2 %
SODIUM SERPL-SCNC: 144 MMOL/L
WBC # FLD AUTO: 8.04 K/UL
WRIGHT STN STL: NEGATIVE

## 2023-09-08 LAB
DEPRECATED O AND P PREP STL: NORMAL
G LAMBLIA AG STL QL: NORMAL

## 2023-09-11 LAB
BACTERIA STL CULT: NORMAL
C DIFF TOX B STL QL CT TISS CULT: NORMAL
Lab: NORMAL

## 2024-02-21 ENCOUNTER — NON-APPOINTMENT (OUTPATIENT)
Age: 59
End: 2024-02-21

## 2024-05-16 DIAGNOSIS — Z12.31 ENCOUNTER FOR SCREENING MAMMOGRAM FOR MALIGNANT NEOPLASM OF BREAST: ICD-10-CM

## 2024-05-20 ENCOUNTER — NON-APPOINTMENT (OUTPATIENT)
Age: 59
End: 2024-05-20

## 2024-05-28 ENCOUNTER — NON-APPOINTMENT (OUTPATIENT)
Age: 59
End: 2024-05-28

## 2024-05-29 ENCOUNTER — APPOINTMENT (OUTPATIENT)
Dept: INTERNAL MEDICINE | Facility: CLINIC | Age: 59
End: 2024-05-29

## 2024-05-29 VITALS
RESPIRATION RATE: 17 BRPM | WEIGHT: 180 LBS | BODY MASS INDEX: 33.13 KG/M2 | OXYGEN SATURATION: 98 % | TEMPERATURE: 98.2 F | HEIGHT: 62 IN | DIASTOLIC BLOOD PRESSURE: 78 MMHG | SYSTOLIC BLOOD PRESSURE: 120 MMHG | HEART RATE: 74 BPM

## 2024-05-29 DIAGNOSIS — H66.90 OTITIS MEDIA, UNSPECIFIED, UNSPECIFIED EAR: ICD-10-CM

## 2024-05-29 PROCEDURE — XXXXX: CPT | Mod: 1L

## 2024-05-29 RX ORDER — CIPROFLOXACIN HYDROCHLORIDE 500 MG/1
500 TABLET, FILM COATED ORAL
Qty: 28 | Refills: 0 | Status: DISCONTINUED | COMMUNITY
Start: 2023-09-05 | End: 2024-05-29

## 2024-05-29 RX ORDER — LEVOFLOXACIN 500 MG/1
500 TABLET, FILM COATED ORAL DAILY
Qty: 10 | Refills: 0 | Status: ACTIVE | COMMUNITY
Start: 2024-05-29 | End: 1900-01-01

## 2024-10-07 ENCOUNTER — RESULT CHARGE (OUTPATIENT)
Age: 59
End: 2024-10-07

## 2024-10-08 ENCOUNTER — TRANSCRIPTION ENCOUNTER (OUTPATIENT)
Age: 59
End: 2024-10-08

## 2024-10-08 ENCOUNTER — NON-APPOINTMENT (OUTPATIENT)
Age: 59
End: 2024-10-08

## 2024-10-08 ENCOUNTER — APPOINTMENT (OUTPATIENT)
Dept: INTERNAL MEDICINE | Facility: CLINIC | Age: 59
End: 2024-10-08
Payer: COMMERCIAL

## 2024-10-08 VITALS
RESPIRATION RATE: 17 BRPM | SYSTOLIC BLOOD PRESSURE: 124 MMHG | BODY MASS INDEX: 39.38 KG/M2 | WEIGHT: 214 LBS | HEART RATE: 77 BPM | DIASTOLIC BLOOD PRESSURE: 72 MMHG | TEMPERATURE: 98.4 F | OXYGEN SATURATION: 98 % | HEIGHT: 62 IN

## 2024-10-08 DIAGNOSIS — Z00.00 ENCOUNTER FOR GENERAL ADULT MEDICAL EXAMINATION W/OUT ABNORMAL FINDINGS: ICD-10-CM

## 2024-10-08 DIAGNOSIS — R53.83 OTHER FATIGUE: ICD-10-CM

## 2024-10-08 DIAGNOSIS — E66.9 OBESITY, UNSPECIFIED: ICD-10-CM

## 2024-10-08 DIAGNOSIS — Z13.1 ENCOUNTER FOR SCREENING FOR DIABETES MELLITUS: ICD-10-CM

## 2024-10-08 DIAGNOSIS — G35 MULTIPLE SCLEROSIS: ICD-10-CM

## 2024-10-08 DIAGNOSIS — Z12.11 ENCOUNTER FOR SCREENING FOR MALIGNANT NEOPLASM OF COLON: ICD-10-CM

## 2024-10-08 PROCEDURE — 99396 PREV VISIT EST AGE 40-64: CPT | Mod: 25

## 2024-10-08 PROCEDURE — 93000 ELECTROCARDIOGRAM COMPLETE: CPT

## 2024-10-08 PROCEDURE — 99214 OFFICE O/P EST MOD 30 MIN: CPT | Mod: 25

## 2024-10-08 RX ORDER — TIRZEPATIDE 2.5 MG/.5ML
2.5 INJECTION, SOLUTION SUBCUTANEOUS
Qty: 2 | Refills: 0 | Status: ACTIVE | COMMUNITY
Start: 2024-10-08 | End: 1900-01-01

## 2024-10-09 ENCOUNTER — TRANSCRIPTION ENCOUNTER (OUTPATIENT)
Age: 59
End: 2024-10-09

## 2024-10-09 RX ORDER — SEMAGLUTIDE 0.25 MG/.5ML
0.25 INJECTION, SOLUTION SUBCUTANEOUS
Qty: 1 | Refills: 0 | Status: ACTIVE | COMMUNITY
Start: 2024-10-09 | End: 1900-01-01

## 2024-10-10 ENCOUNTER — TRANSCRIPTION ENCOUNTER (OUTPATIENT)
Age: 59
End: 2024-10-10

## 2024-10-10 LAB
25(OH)D3 SERPL-MCNC: 56.8 NG/ML
ALBUMIN SERPL ELPH-MCNC: 4.5 G/DL
ALP BLD-CCNC: 70 U/L
ALT SERPL-CCNC: 10 U/L
ANION GAP SERPL CALC-SCNC: 15 MMOL/L
AST SERPL-CCNC: 12 U/L
BASOPHILS # BLD AUTO: 0.05 K/UL
BASOPHILS NFR BLD AUTO: 0.6 %
BILIRUB SERPL-MCNC: 0.8 MG/DL
BUN SERPL-MCNC: 11 MG/DL
CALCIUM SERPL-MCNC: 9.3 MG/DL
CHLORIDE SERPL-SCNC: 105 MMOL/L
CHOLEST SERPL-MCNC: 261 MG/DL
CO2 SERPL-SCNC: 24 MMOL/L
CREAT SERPL-MCNC: 0.73 MG/DL
EGFR: 94 ML/MIN/1.73M2
EOSINOPHIL # BLD AUTO: 0.49 K/UL
EOSINOPHIL NFR BLD AUTO: 6.4 %
ESTIMATED AVERAGE GLUCOSE: 100 MG/DL
GLUCOSE SERPL-MCNC: 96 MG/DL
HBA1C MFR BLD HPLC: 5.1 %
HCT VFR BLD CALC: 46.2 %
HDLC SERPL-MCNC: 60 MG/DL
HGB BLD-MCNC: 14.8 G/DL
IMM GRANULOCYTES NFR BLD AUTO: 0.4 %
LDLC SERPL CALC-MCNC: 180 MG/DL
LYMPHOCYTES # BLD AUTO: 2.63 K/UL
LYMPHOCYTES NFR BLD AUTO: 34.2 %
MAN DIFF?: NORMAL
MCHC RBC-ENTMCNC: 30.3 PG
MCHC RBC-ENTMCNC: 32 GM/DL
MCV RBC AUTO: 94.5 FL
MONOCYTES # BLD AUTO: 0.4 K/UL
MONOCYTES NFR BLD AUTO: 5.2 %
NEUTROPHILS # BLD AUTO: 4.1 K/UL
NEUTROPHILS NFR BLD AUTO: 53.2 %
NONHDLC SERPL-MCNC: 201 MG/DL
PLATELET # BLD AUTO: 153 K/UL
POTASSIUM SERPL-SCNC: 4.9 MMOL/L
PROT SERPL-MCNC: 6.3 G/DL
RBC # BLD: 4.89 M/UL
RBC # FLD: 14.1 %
SODIUM SERPL-SCNC: 144 MMOL/L
TRIGL SERPL-MCNC: 121 MG/DL
TSH SERPL-ACNC: 2.27 UIU/ML
VIT B12 SERPL-MCNC: 1267 PG/ML
WBC # FLD AUTO: 7.7 K/UL

## 2024-12-31 DIAGNOSIS — H54.7 UNSPECIFIED VISUAL LOSS: ICD-10-CM

## 2025-05-15 ENCOUNTER — APPOINTMENT (OUTPATIENT)
Dept: INTERNAL MEDICINE | Facility: CLINIC | Age: 60
End: 2025-05-15
Payer: COMMERCIAL

## 2025-05-15 VITALS
BODY MASS INDEX: 34.23 KG/M2 | WEIGHT: 186 LBS | TEMPERATURE: 97.1 F | DIASTOLIC BLOOD PRESSURE: 78 MMHG | OXYGEN SATURATION: 97 % | HEIGHT: 62 IN | SYSTOLIC BLOOD PRESSURE: 128 MMHG | HEART RATE: 88 BPM

## 2025-05-15 DIAGNOSIS — M77.8 OTHER ENTHESOPATHIES, NOT ELSEWHERE CLASSIFIED: ICD-10-CM

## 2025-05-15 DIAGNOSIS — M79.603 PAIN IN ARM, UNSPECIFIED: ICD-10-CM

## 2025-05-15 DIAGNOSIS — E66.9 OBESITY, UNSPECIFIED: ICD-10-CM

## 2025-05-15 DIAGNOSIS — E78.5 HYPERLIPIDEMIA, UNSPECIFIED: ICD-10-CM

## 2025-05-15 DIAGNOSIS — G35 MULTIPLE SCLEROSIS: ICD-10-CM

## 2025-05-15 PROCEDURE — 99214 OFFICE O/P EST MOD 30 MIN: CPT

## 2025-05-15 PROCEDURE — G2211 COMPLEX E/M VISIT ADD ON: CPT | Mod: NC

## 2025-05-15 RX ORDER — MELOXICAM 15 MG/1
15 TABLET ORAL
Qty: 30 | Refills: 0 | Status: ACTIVE | COMMUNITY
Start: 2025-05-15 | End: 1900-01-01

## 2025-05-16 ENCOUNTER — APPOINTMENT (OUTPATIENT)
Dept: RADIOLOGY | Facility: CLINIC | Age: 60
End: 2025-05-16
Payer: COMMERCIAL

## 2025-05-16 ENCOUNTER — OUTPATIENT (OUTPATIENT)
Dept: OUTPATIENT SERVICES | Facility: HOSPITAL | Age: 60
LOS: 1 days | End: 2025-05-16
Payer: COMMERCIAL

## 2025-05-16 DIAGNOSIS — M79.603 PAIN IN ARM, UNSPECIFIED: ICD-10-CM

## 2025-05-16 PROCEDURE — 73060 X-RAY EXAM OF HUMERUS: CPT | Mod: 26,LT

## 2025-05-16 PROCEDURE — 73060 X-RAY EXAM OF HUMERUS: CPT

## 2025-05-16 RX ORDER — SEMAGLUTIDE 0.68 MG/ML
2 INJECTION, SOLUTION SUBCUTANEOUS
Refills: 0 | Status: ACTIVE | COMMUNITY